# Patient Record
Sex: MALE | URBAN - METROPOLITAN AREA
[De-identification: names, ages, dates, MRNs, and addresses within clinical notes are randomized per-mention and may not be internally consistent; named-entity substitution may affect disease eponyms.]

---

## 2019-04-30 ENCOUNTER — EMERGENCY (EMERGENCY)
Facility: HOSPITAL | Age: 59
LOS: 1 days | Discharge: HOME | End: 2019-04-30
Attending: EMERGENCY MEDICINE | Admitting: EMERGENCY MEDICINE
Payer: MEDICAID

## 2019-04-30 VITALS
RESPIRATION RATE: 18 BRPM | HEART RATE: 98 BPM | DIASTOLIC BLOOD PRESSURE: 74 MMHG | TEMPERATURE: 98 F | SYSTOLIC BLOOD PRESSURE: 139 MMHG | OXYGEN SATURATION: 94 %

## 2019-04-30 PROCEDURE — 99283 EMERGENCY DEPT VISIT LOW MDM: CPT

## 2019-04-30 NOTE — ED ADULT TRIAGE NOTE - CHIEF COMPLAINT QUOTE
BIBEMS from [facility] [employer] for uncontrollable hiccups since last night, pt c/o intermittent dizziness, "sometimes HA and nausea when hiccups come", pt admits to ETOH daily (one glass of vodka), last drank last night

## 2019-04-30 NOTE — ED PROVIDER NOTE - ATTENDING CONTRIBUTION TO CARE
58 year old male presents with hiccups, self resolved. has no complaints at this time.  will dc with return precautions

## 2019-04-30 NOTE — ED PROVIDER NOTE - OBJECTIVE STATEMENT
57 y/o male hx HTN presents to ED c/o hiccups x 1 day. Pt. states at 10 pm last night developed hiccups which were persistent through the night - states usualy has a shot of vodka before going to sleep, but he tried to take one or two more which didn't help. Pt. states hiccups were present throughout the morning and called EMS to come to ED - patient now states that the hiccups have resolved and is only experiencing some mild upper abdominal discomfort. Fabien cp sob fever chills nausea vomit.

## 2019-05-01 ENCOUNTER — OUTPATIENT (OUTPATIENT)
Dept: OUTPATIENT SERVICES | Facility: HOSPITAL | Age: 59
LOS: 1 days | End: 2019-05-01
Payer: MEDICAID

## 2019-05-01 DIAGNOSIS — Z71.89 OTHER SPECIFIED COUNSELING: ICD-10-CM

## 2019-05-01 PROCEDURE — G9001: CPT

## 2025-06-13 ENCOUNTER — RESULT REVIEW (OUTPATIENT)
Age: 65
End: 2025-06-13

## 2025-06-18 ENCOUNTER — RESULT REVIEW (OUTPATIENT)
Age: 65
End: 2025-06-18

## 2025-07-01 ENCOUNTER — TRANSCRIPTION ENCOUNTER (OUTPATIENT)
Age: 65
End: 2025-07-01